# Patient Record
Sex: FEMALE | Race: WHITE
[De-identification: names, ages, dates, MRNs, and addresses within clinical notes are randomized per-mention and may not be internally consistent; named-entity substitution may affect disease eponyms.]

---

## 2019-04-28 ENCOUNTER — HOSPITAL ENCOUNTER (EMERGENCY)
Dept: HOSPITAL 97 - ER | Age: 44
LOS: 1 days | Discharge: HOME | End: 2019-04-29
Payer: COMMERCIAL

## 2019-04-28 DIAGNOSIS — F41.9: ICD-10-CM

## 2019-04-28 DIAGNOSIS — J44.9: Primary | ICD-10-CM

## 2019-04-28 PROCEDURE — 71046 X-RAY EXAM CHEST 2 VIEWS: CPT

## 2019-04-28 NOTE — XMS REPORT
Patient Summary Document

 Created on:2019



Patient:CHARLIE MAXWELL

Sex:Female

:1975

External Reference #:[id number]





Demographics







 Address  [address]



   [place] [address]

 

 Home Phone  [phone]

 

 Email Address  N

 

 Preferred Language  Unknown

 

 Marital Status  Unknown

 

 Voodoo Affiliation  Unknown

 

 Race  Unknown

 

 Additional Race(s)  Unavailable

 

 Ethnic Group  Unknown









Author







 Organization  [facility]

 

 Address  [address] [place] [id number]

 

 Phone  [phone]









Care Team Providers







 Name  Role  Phone

 

 Unavailable  Unavailable  Unavailable









Problems

This patient has no known problems.



Allergies, Adverse Reactions, Alerts

This patient has no known allergies or adverse reactions.



Medications

This patient has no known medications.

## 2019-04-29 NOTE — ER
Nurse's Notes                                                                                     

 [facility]                                                                 

Name: Molly Liao                                                                            

Age: 44 yrs                                                                                       

Sex: Female                                                                                       

: 1975                                                                                   

MRN: [id number]                                                                                   

Arrival Date: 2019                                                                          

Time: 21:35                                                                                       

Account#: [id number]                                                                            

Bed 8                                                                                             

Private MD: RACQUEL HOWARD                                                                      

Diagnosis: Chronic obstructive pulmonary disease, unspecified;Anxiety disorder, unspecified       

                                                                                                  

Presentation:                                                                                     

                                                                                             

21:42 Presenting complaint: Patient states: I have felt like I am short of breath since       la1 

      yesterday while I was at work. I have a history of COPD but also anxiety and I cant         

      tell what is going on. Transition of care: patient was not received from another            

      setting of care. Onset of symptoms was 2019. Risk Assessment: Do you want to      

      hurt yourself or someone else? Patient reports no desire to harm self or others.            

      Initial Sepsis Screen: Does the patient meet any 2 criteria? No. Patient's initial          

      sepsis screen is negative. Does the patient have a suspected source of infection? No.       

      Patient's initial sepsis screen is negative. Care prior to arrival: None.                   

21:42 Method Of Arrival: Ambulatory                                                           la1 

21:42 Acuity: ZANDRA 3                                                                           la1 

                                                                                                  

Triage Assessment:                                                                                

22:29 General: Appears in no apparent distress. comfortable. Respiratory: Onset: The          mg2 

      symptoms/episode began/occurred gradually, the patient has mild shortness of breath.        

                                                                                                  

OB/GYN:                                                                                           

23:00 lmp unknown                                                                             mg2 

                                                                                                  

Historical:                                                                                       

- Allergies:                                                                                      

21:41 No Known Allergies;                                                                     la1 

- PMHx:                                                                                           

21:41 COPD;                                                                                   la1 

                                                                                                  

- Immunization history:: Adult Immunizations up to date.                                          

- Social history:: Smoking status: Patient/guardian denies using tobacco.                         

- Ebola Screening: : No symptoms or risks identified at this time.                                

                                                                                                  

                                                                                                  

Screenin:29 Abuse screen: Denies threats or abuse. Denies injuries from another. Nutritional        mg2 

      screening: No deficits noted. Tuberculosis screening: No symptoms or risk factors           

      identified. Fall Risk None identified.                                                      

                                                                                                  

Assessment:                                                                                       

21:43 Reassessment: pt speaking full sentences in triage in no Respiratory distress.          la1 

      Respiratory: Airway is patent Trachea midline Respiratory effort is even, unlabored,        

      Respiratory pattern is regular, symmetrical, Breath sounds are clear.                       

22:28 General: Appears in no apparent distress. comfortable, Behavior is calm, cooperative.   mg2 

      Pain: Denies pain. Neuro: Level of Consciousness is awake, alert, obeys commands,           

      Oriented to person, place, time, situation. Cardiovascular: Capillary refill < 3            

      seconds Patient's skin is warm and dry. Respiratory: Reports shortness of breath. GI:       

      No signs and/or symptoms were reported involving the gastrointestinal system. : No        

      signs and/or symptoms were reported regarding the genitourinary system. EENT: No            

      deficits noted. Derm: Skin is intact, is healthy with good turgor, Skin is pink, warm \T\   

      dry. normal. Musculoskeletal: Circulation, motion, and sensation intact. Capillary          

      refill < 3 seconds.                                                                         

23:00 Cardiovascular: Rhythm is sinus rhythm.                                                 mg2 

23:45 Reassessment: Patient appears in no apparent distress at this time. Patient and/or      aa1 

      family updated on plan of care and expected duration. Pain level reassessed. Patient is     

      alert, oriented x 3, equal unlabored respirations, skin warm/dry/pink. Awaiting x-ray.      

                                                                                                  

Vital Signs:                                                                                      

21:42  / 76; Pulse 72; Resp 18; Temp 97.8; Pulse Ox 100% on R/A; Weight 50.8 kg; Height la1 

      5 ft. 5 in. (165.10 cm);                                                                    

23:45  / 68; Pulse 72; Resp 16; Pulse Ox 100% on R/A; Pain 0/10;                        aa1 

21:42 Body Mass Index 18.64 (50.80 kg, 165.10 cm)                                             la1 

                                                                                                  

ED Course:                                                                                        

21:35 Patient arrived in ED.                                                                  am2 

21:37 RACQUEL HOWARD is Private Physician.                                                  am2 

21:42 Arm band placed on left wrist.                                                          la1 

21:43 Triage completed.                                                                       la1 

22:04 Des Marks NP is PHCP.                                                           pm1 

22:04 Jaime Fisher MD is Attending Physician.                                             pm1 

22:09 Jose Cox, KARAN is Primary Nurse.                                                  mg2 

22:29 Patient has correct armband on for positive identification. Placed in gown. Bed in low  mg2 

      position. Call light in reach. Side rails up X 1. Pulse ox on. NIBP on. Door closed.        

      Warm blanket given.                                                                         

22:29 No provider procedures requiring assistance completed. Patient did not have IV access   mg2 

      during this emergency room visit.                                                           

23:58 Patient moved to radiology via wheelchair.                                              kw  

23:58 X-ray completed. Patient tolerated procedure well.                                      kw  

23:58 Patient moved back from radiology.                                                      kw  

23:59 Chest Pa And Lat (2 Views) XRAY In Process Unspecified.                                 EDMS

                                                                                                  

Administered Medications:                                                                         

22:27 Drug: Albuterol - atroVENT (3:1) (2.5 mg - 0.5 mg) 3 ml Route: Nebulizer;               mg2 

23:25 Follow up: Response: No adverse reaction; Marked relief of symptoms                     aa1 

22:27 Drug: predniSONE 60 mg Route: PO;                                                       mg2 

23:25 Follow up: Response: No adverse reaction; Marked relief of symptoms                     aa1 

                                                                                                  

                                                                                                  

Outcome:                                                                                          

                                                                                             

00:09 Discharge ordered by MD.                                                                pm1 

00:39 Discharged to home ambulatory.                                                          mg2 

00:39 Condition: stable                                                                           

00:39 Discharge instructions given to patient, Instructed on discharge instructions, follow       

      up and referral plans. medication usage, Demonstrated understanding of instructions,        

      follow-up care, medications, Prescriptions given X 3.                                       

00:41 Patient left the ED.                                                                    mg2 

                                                                                                  

Signatures:                                                                                       

Dispatcher [employer]                           EDMS                                                 

Katiuska England RN                  RN   aa1                                                  

Yoana Avila Lee, RN                         RN   la1                                                  

Des Marks, MARY JANE                    NP   pm1                                                  

Tuyet German                               am2                                                  

Jose Cox RN RN   mg2                                                  

                                                                                                  

**************************************************************************************************

## 2019-04-29 NOTE — EDPHYS
Physician Documentation                                                                           

 [facility]                                                                 

Name: Molly Liao                                                                            

Age: 44 yrs                                                                                       

Sex: Female                                                                                       

: 1975                                                                                   

MRN: [id number]                                                                                   

Arrival Date: 2019                                                                          

Time: 21:35                                                                                       

Account#: [id number]                                                                            

Bed 8                                                                                             

Private MD: RACQUEL HOWARD ED Physician Jaime Fisher                                                                      

HPI:                                                                                              

                                                                                             

22:30 This 44 yrs old  Female presents to ER via Ambulatory with complaints of       pm1 

      Breathing Difficulty.                                                                       

22:30 The patient has shortness of breath at rest. Onset: The symptoms/episode began/occurred pm1 

      yesterday. Duration: The symptoms are continuous. The patient's shortness of breath is      

      aggravated by anxiety, is alleviated by nothing. Associated signs and symptoms:             

      Pertinent positives: non-productive cough, Pertinent negatives: chest pain, fever,          

      nausea, vomiting. Severity of symptoms: in the emergency department the symptoms are        

      unchanged. The patient has experienced similar episodes in the past, several times. The     

      patient has not recently seen a physician. Patient isn't certain if it is her COPD or       

      her anxiety. Patient takes anoro and albuterol for her COPD. Patient used to take           

      hydroxyzine for her anxiety but she stopped taking them because they made her too           

      sleepy.                                                                                     

                                                                                                  

OB/GYN:                                                                                           

23:00 lmp unknown                                                                             mg2 

                                                                                                  

Historical:                                                                                       

- Allergies:                                                                                      

21:41 No Known Allergies;                                                                     la1 

- PMHx:                                                                                           

21:41 COPD;                                                                                   la1 

                                                                                                  

- Immunization history:: Adult Immunizations up to date.                                          

- Social history:: Smoking status: Patient/guardian denies using tobacco.                         

- Ebola Screening: : No symptoms or risks identified at this time.                                

                                                                                                  

                                                                                                  

ROS:                                                                                              

22:30 Constitutional: Negative for fever, chills, and weight loss, Eyes: Negative for injury, pm1 

      pain, redness, and discharge, ENT: Negative for injury, pain, and discharge, Neck:          

      Negative for injury, pain, and swelling, Cardiovascular: Negative for chest pain,           

      palpitations, and edema.                                                                    

22:30 Abdomen/GI: Negative for abdominal pain, nausea, vomiting, diarrhea, and constipation,      

      Back: Negative for injury and pain, : Negative for injury, bleeding, discharge, and       

      swelling, MS/Extremity: Negative for injury and deformity, Skin: Negative for injury,       

      rash, and discoloration, Neuro: Negative for headache, weakness, numbness, tingling,        

      and seizure.                                                                                

22:30 Respiratory: Positive for cough, with no reported sputum, shortness of breath, Negative     

      for wheezing.                                                                               

22:30 Psych: Positive for anxiety, Negative for depression.                                       

                                                                                                  

Exam:                                                                                             

22:30 Constitutional:  This is a well developed, well nourished patient who is awake, alert,  pm1 

      and in no acute distress. Head/Face:  Normocephalic, atraumatic. Eyes:  Pupils equal        

      round and reactive to light, extra-ocular motions intact.  Lids and lashes normal.          

      Conjunctiva and sclera are non-icteric and not injected.  Cornea within normal limits.      

      Periorbital areas with no swelling, redness, or edema. ENT:  Nares patent. No nasal         

      discharge, no septal abnormalities noted.  Tympanic membranes are normal and external       

      auditory canals are clear.  Oropharynx with no redness, swelling, or masses, exudates,      

      or evidence of obstruction, uvula midline.  Mucous membranes moist. Neck:  Trachea          

      midline, no thyromegaly or masses palpated, and no cervical lymphadenopathy.  Supple,       

      full range of motion without nuchal rigidity, or vertebral point tenderness.  No            

      Meningismus. Chest/axilla:  Normal chest wall appearance and motion.  Nontender with no     

      deformity.  No lesions are appreciated. Cardiovascular:  Regular rate and rhythm with a     

      normal S1 and S2.  No gallops, murmurs, or rubs.  Normal PMI, no JVD.  No pulse             

      deficits. Respiratory:  Lungs have equal breath sounds bilaterally, clear to                

      auscultation and percussion.  No rales, rhonchi or wheezes noted.  No increased work of     

      breathing, no retractions or nasal flaring. Abdomen/GI:  Soft, non-tender, with normal      

      bowel sounds.  No distension or tympany.  No guarding or rebound.  No evidence of           

      tenderness throughout. Back:  No spinal tenderness.  No costovertebral tenderness.          

      Full range of motion. Skin:  Warm, dry with normal turgor.  Normal color with no            

      rashes, no lesions, and no evidence of cellulitis. MS/ Extremity:  Pulses equal, no         

      cyanosis.  Neurovascular intact.  Full, normal range of motion.                             

22:30 Neuro: Orientation: is normal, Motor: is normal, moves all fours, Sensation: is normal,     

      no obvious gross deficits.                                                                  

                                                                                                  

Vital Signs:                                                                                      

21:42  / 76; Pulse 72; Resp 18; Temp 97.8; Pulse Ox 100% on R/A; Weight 50.8 kg; Height la1 

      5 ft. 5 in. (165.10 cm);                                                                    

23:45  / 68; Pulse 72; Resp 16; Pulse Ox 100% on R/A; Pain 0/10;                        aa1 

21:42 Body Mass Index 18.64 (50.80 kg, 165.10 cm)                                             la1 

                                                                                                  

MDM:                                                                                              

22:05 Patient medically screened.                                                             pm1 

                                                                                             

00:08 Data reviewed: vital signs. Data interpreted: Pulse oximetry: on room air is 100 %.     pm1 

      Interpretation: normal. Counseling: I had a detailed discussion with the patient and/or     

      guardian regarding: the historical points, exam findings, and any diagnostic results        

      supporting the discharge/admit diagnosis, radiology results, the need for outpatient        

      follow up, to return to the emergency department if symptoms worsen or persist or if        

      there are any questions or concerns that arise at home.                                     

                                                                                                  

                                                                                             

22:14 Order name: Chest Pa And Lat (2 Views) XRAY                                             pm1 

                                                                                                  

Administered Medications:                                                                         

                                                                                             

22:27 Drug: Albuterol - atroVENT (3:1) (2.5 mg - 0.5 mg) 3 ml Route: Nebulizer;               mg2 

23:25 Follow up: Response: No adverse reaction; Marked relief of symptoms                     aa1 

22:27 Drug: predniSONE 60 mg Route: PO;                                                       mg2 

23:25 Follow up: Response: No adverse reaction; Marked relief of symptoms                     aa1 

                                                                                                  

                                                                                                  

Disposition:                                                                                      

19 00:09 Discharged to Home. Impression: Chronic obstructive pulmonary disease,             

  unspecified, Anxiety disorder, unspecified.                                                     

- Condition is Stable.                                                                            

- Discharge Instructions: Chronic Obstructive Pulmonary Disease, How to Use an Inhaler,           

  Generalized Anxiety Disorder.                                                                   

- Prescriptions for Ativan 0.5 mg Oral Tablet - take 1 tablet by ORAL route every 8               

  hours As needed; 6 tablet. Albuterol Sulfate 2.5 mg /3 mL (0.083 %) Inhalation                  

  Solution for Nebulization - inhale 1 unit by NEBULIZATION route every 8 hours As                

  needed; 1 box. Medrol (Karan) 4 mg Oral Tablets, Dose Pack - take 1 tablet by ORAL                

  route as directed - follow package instructions; 1 packet.                                      

- Medication Reconciliation Form, Thank You Letter, Antibiotic Education, Prescription            

  Opioid Use, Work release form form.                                                             

- Follow up: Emergency Department; When: As needed; Reason: Worsening of condition.               

  Follow up: Private Physician; When: 2 - 3 days; Reason: Recheck today's complaints,             

  Continuance of care, Re-evaluation by your physician.                                           

- Problem is new.                                                                                 

- Symptoms have improved.                                                                         

                                                                                                  

                                                                                                  

                                                                                                  

Addendum:                                                                                         

2019                                                                                        

     11:18 Co-signature as Attending Physician, Jaime Fisher MD I agree with the assessment and  c
ha

           plan of care.                                                                          

                                                                                                  

Signatures:                                                                                       

Dispatcher [employer]                           EDJaime Shine MD MD cha Attema, Lee RN                         RN   la1                                                  

Des Marks NP                    NP   pm1                                                  

Jose Cox RN RN   mg2                                                  

Katiuska England RN   aa1                                                  

                                                                                                  

Corrections: (The following items were deleted from the chart)                                    

                                                                                             

00:41 00:09 2019 00:09 Discharged to Home. Impression: Chronic obstructive pulmonary    mg2 

      disease, unspecified; Anxiety disorder, unspecified. Condition is Stable. Forms are         

      Medication Reconciliation Form, Thank You Letter, Antibiotic Education, Prescription        

      Opioid Use. Follow up: Emergency Department; When: As needed; Reason: Worsening of          

      condition. Follow up: Private Physician; When: 2 - 3 days; Reason: Recheck today's          

      complaints, Continuance of care, Re-evaluation by your physician. Problem is new.           

      Symptoms have improved. pm1                                                                 

                                                                                                  

**************************************************************************************************

## 2019-04-29 NOTE — RAD REPORT
EXAM DESCRIPTION:  RAD - Chest Pa And Lat (2 Views) - 4/29/2019 12:01 am

 

CLINICAL HISTORY:  COPD;Cough

Chest pain.

 

COMPARISON:  No comparisons

 

TECHNIQUE:  PA and lateral views of the chest were obtained.

 

FINDINGS:  The lungs are hyperexpanded compatible with COPD. The heart is upper limit of normal in si
ze. No fracture or aggressive bony process.

 

IMPRESSION:  COPD without acute process identified.

## 2019-06-30 ENCOUNTER — HOSPITAL ENCOUNTER (EMERGENCY)
Dept: HOSPITAL 97 - ER | Age: 44
Discharge: HOME | End: 2019-06-30
Payer: COMMERCIAL

## 2019-06-30 DIAGNOSIS — F41.9: ICD-10-CM

## 2019-06-30 DIAGNOSIS — R20.2: Primary | ICD-10-CM

## 2019-06-30 LAB
BUN BLD-MCNC: 10 MG/DL (ref 7–18)
GLUCOSE SERPLBLD-MCNC: 73 MG/DL (ref 74–106)
HCT VFR BLD CALC: 40.7 % (ref 36–45)
LYMPHOCYTES # SPEC AUTO: 1.7 K/UL (ref 0.7–4.9)
METHAMPHET UR QL SCN: NEGATIVE
PMV BLD: 9 FL (ref 7.6–11.3)
POTASSIUM SERPL-SCNC: 3.4 MMOL/L (ref 3.5–5.1)
RBC # BLD: 4.31 M/UL (ref 3.86–4.86)
THC SERPL-MCNC: NEGATIVE NG/ML

## 2019-06-30 PROCEDURE — 81003 URINALYSIS AUTO W/O SCOPE: CPT

## 2019-06-30 PROCEDURE — 85025 COMPLETE CBC W/AUTO DIFF WBC: CPT

## 2019-06-30 PROCEDURE — 99284 EMERGENCY DEPT VISIT MOD MDM: CPT

## 2019-06-30 PROCEDURE — 80048 BASIC METABOLIC PNL TOTAL CA: CPT

## 2019-06-30 PROCEDURE — 36415 COLL VENOUS BLD VENIPUNCTURE: CPT

## 2019-06-30 PROCEDURE — 70450 CT HEAD/BRAIN W/O DYE: CPT

## 2019-06-30 PROCEDURE — 80307 DRUG TEST PRSMV CHEM ANLYZR: CPT

## 2019-06-30 PROCEDURE — 81025 URINE PREGNANCY TEST: CPT

## 2019-06-30 NOTE — RAD REPORT
EXAM DESCRIPTION:  CT - Head Brain Wo Cont - 6/30/2019 7:47 pm

 

CLINICAL HISTORY:  NUMBNESS

Headache, drowsiness

 

COMPARISON:  Head Brain Wo Cont dated 7/16/2017

 

TECHNIQUE:  All CT scans are performed using dose optimization technique as appropriate and may inclu
de automated exposure control or mA/KV adjustment according to patient size.

 

FINDINGS:  No intracranial hemorrhage, hydrocephalus or extra-axial fluid collection.No areas of brai
n edema or evidence of midline shift.

 

Mild right mastoid effusion. The paranasal sinuses and mastoids are otherwise clear. The calvarium is
 intact.

 

IMPRESSION:  No acute intracranial abnormality.

## 2019-06-30 NOTE — ER
Nurse's Notes                                                                                     

 [facility]                                                                 

Name: Molly Liao                                                                            

Age: 44 yrs                                                                                       

Sex: Female                                                                                       

: 1975                                                                                   

MRN: [id number]                                                                                   

Arrival Date: 2019                                                                          

Time: 18:37                                                                                       

Account#: [id number]                                                                            

Bed 27                                                                                            

Private MD:                                                                                       

Diagnosis: Paresthesia of skin                                                                    

                                                                                                  

Presentation:                                                                                     

                                                                                             

18:39 Presenting complaint: Patient states: "I woke up about 3 in the morning I noticed       aj1 

      numbness in my whole lip and kind of into my chin and I just feel a little off" Patient     

      reports fatigue, Equal hand , equal smile. Patient ambulated to triage with a          

      steady gait. Transition of care: patient was not received from another setting of care.     

      Onset of symptoms was 2019 at 03:00. Risk Assessment: Do you want to hurt          

      yourself or someone else? Patient reports no desire to harm self or others. Initial         

      Sepsis Screen: Does the patient meet any 2 criteria? No. Patient's initial sepsis           

      screen is negative. Does the patient have a suspected source of infection? No.              

      Patient's initial sepsis screen is negative. Care prior to arrival: None.                   

18:39 Method Of Arrival: Ambulatory                                                           aj1 

18:39 Acuity: ZANDRA 3                                                                           aj1 

                                                                                                  

Triage Assessment:                                                                                

18:42 General: Appears in no apparent distress. comfortable, Behavior is calm, cooperative,   aj1 

      appropriate for age. Pain: Denies pain. Neuro: Level of Consciousness is awake, alert,      

      obeys commands, Oriented to person, place, time, situation. Neuro:  are equal          

      bilaterally Moves all extremities. Full function Gait is steady, Speech is normal,          

      Facial symmetry appears normal, Reports numbness in lower lip and chin. Cardiovascular:     

      Patient's skin is warm and dry. Respiratory: Airway is patent Respiratory effort is         

      even, unlabored, Respiratory pattern is regular, symmetrical.                               

                                                                                                  

OB/GYN:                                                                                           

18:42 LMP 2019                                                                           aj1 

                                                                                                  

Historical:                                                                                       

- Allergies:                                                                                      

18:42 No Known Allergies;                                                                     aj1 

- Home Meds:                                                                                      

18:42 Suboxone sublingual sublingual [Active]; Lexapro Oral [Active];                         aj1 

- PMHx:                                                                                           

18:42 Anxiety; slipped disk in back;                                                          aj1 

- PSHx:                                                                                           

18:42 Tubal ligation;                                                                         aj1 

                                                                                                  

- Immunization history:: Flu vaccine is not up to date.                                           

- Social history:: Smoking status: Patient/guardian denies using tobacco.                         

- Ebola Screening: : Patient denies travel to an Ebola-affected area in the 21 days               

  before illness onset.                                                                           

                                                                                                  

                                                                                                  

Screenin:24 Abuse screen: Denies threats or abuse. Nutritional screening: No deficits noted.        la1 

      Tuberculosis screening: No symptoms or risk factors identified. Fall Risk None              

      identified.                                                                                 

                                                                                                  

Assessment:                                                                                       

19:24 General: Appears in no apparent distress. Behavior is calm, cooperative. Pain: Denies   la1 

      pain. Neuro: Level of Consciousness is awake, alert, obeys commands, Oriented to            

      person, place, time, situation,  are equal bilaterally Moves all extremities. Full     

      function Gait is steady, Speech is normal, Facial symmetry appears normal, Pupils are       

      PERRLA, Numbness in lower lip and chin. Cardiovascular: Capillary refill < 3 seconds        

      Patient's skin is warm and dry. Respiratory: Airway is patent Respiratory effort is         

      even, unlabored, Respiratory pattern is regular, symmetrical. GI: No signs and/or           

      symptoms were reported involving the gastrointestinal system. : No signs and/or           

      symptoms were reported regarding the genitourinary system.                                  

                                                                                                  

Vital Signs:                                                                                      

18:42  / 71; Pulse 90; Resp 18; Temp 98.6; Pulse Ox 100% on R/A;                        aj1 

18:42 Weight 54.43 kg (R); Height 5 ft. 5 in. (165.10 cm) (R);                                aj1 

18:42 Body Mass Index 19.97 (54.43 kg, 165.10 cm)                                             aj1 

                                                                                                  

NIH Stroke Scale Scores:                                                                          

20:44 NIHSS Score: 0                                                                            

                                                                                                  

ED Course:                                                                                        

18:37 Patient arrived in ED.                                                                  as  

18:41 Triage completed.                                                                       aj1 

19:10 Joshua Starks MD is Attending Physician.                                              gs  

19:23 Waqas Saavedra, RN is Primary Nurse.                                                       la1 

19:24 Arm band placed on right wrist.                                                         la1 

19:25 Side rails up X 1.                                                                      la1 

19:45 Initial lab(s) drawn, by me, sent to lab. Inserted saline lock: 20 gauge in right       lt1 

      antecubital area, using aseptic technique.                                                  

19:46 Basic Metabolic Panel Sent.                                                             lt1 

19:46 CBC with Diff Sent.                                                                     lt1 

19:48 CT completed. Patient tolerated procedure well. Patient moved to CT. Patient moved back ka  

      from CT.                                                                                    

19:50 CT Head Brain wo Cont In Process Unspecified.                                           EDMS

20:35 Pike, [place], MD is Referral Physician.                                               

20:53 No provider procedures requiring assistance completed. intact, bleeding controlled, No  la1 

      redness/swelling at site. Pressure dressing applied.                                        

                                                                                                  

Administered Medications:                                                                         

No medications were administered                                                                  

                                                                                                  

                                                                                                  

Outcome:                                                                                          

20:36 Discharge ordered by MD.                                                                gs  

20:53 Discharged to home ambulatory.                                                          la1 

20:53 Condition: stable                                                                           

20:53 Discharge instructions given to patient, Instructed on discharge instructions, follow       

      up and referral plans. medication usage, Demonstrated understanding of instructions,        

      follow-up care.                                                                             

20:53 Patient left the ED.                                                                    la1 

                                                                                                  

                                                                                                  

NIH Stroke Scale - NIH Stroke Score                                                               

Date: 2019                                                                                  

Time: 20:44                                                                                       

Total Score = 0                                                                                   

  1a. Level of Consciousness (LOC) - 0(Alert)                                                     

  1b. Level of Consciousness (LOC) (Year \T\ Age) - 0(Both)                                       

  1c. LOC Commands (Open \T\ Closes Eyes/) - 0(Both)                                          

   2. Best Gaze (Lateral Gaze Paresis) - 0(Normal)                                                

   3. Visual Field Loss - 0(No visual loss)                                                       

   4. Facial Palsy - 0(Normal)                                                                    

  5a. Left Arm: Motor (10-second hold) - 0(No drift)                                              

  5b. Right Arm: Motor (10-second hold) - 0(No drift)                                             

  6a. Left Leg: Motor (5-second hold - always test supine) - 0(No drift)                          

  6b. Right Leg: Motor (5-second hold - always test supine) - 0(No drift)                         

   7. Limb Ataxia (finger/nose \T\ heel/shin - test with eyes open) - 0(Absent)                   

   8. Sensory Loss (pinprick arms/legs/face) - 0(Normal)                                          

   9. Best Language: Aphasia (description/naming/reading) - 0(No aphasia)                         

  10. Dysarthria (speech clarity - read or repeat words) - 0(Normal)                              

  11. Extinction and Inattention (visual/tactile/auditory/spatial/personal) - 0(No                

      abnormality)                                                                                

Initials:                                                                                       

                                                                                                  

Signatures:                                                                                       

Dispatcher [employer]                           EDMS                                                 

Pauline Boone, RN                     RN   aj1                                                  

Taylor Fountain Lee, RN                         RN   la1                                                  

Maisha Pedersen Gregory, MD MD                                                      

Michaels, Jessica                                   1                                                  

                                                                                                  

**************************************************************************************************

## 2019-06-30 NOTE — EDPHYS
Physician Documentation                                                                           

 [facility]                                                                 

Name: Molly Liao                                                                            

Age: 44 yrs                                                                                       

Sex: Female                                                                                       

: 1975                                                                                   

MRN: [id number]                                                                                   

Arrival Date: 2019                                                                          

Time: 18:37                                                                                       

Account#: [id number]                                                                            

Bed 27                                                                                            

Private MD:                                                                                       

ED Physician Joshua Starks                                                                       

HPI:                                                                                              

                                                                                             

20:29 This 44 yrs old  Female presents to ER via Ambulatory with complaints of       gs  

      Numbness Of Lips.                                                                           

20:29 The patient presents to the emergency department with paresthesias of the lower lip.    gs  

      Onset: The symptoms/episode began/occurred today, at 03:00. Context: occurred at home,      

      occurred while the patient was receiving bad or unexpected news. Associated signs and       

      symptoms: Pertinent negatives: altered mental status, dizziness, fever, seizure,            

      syncope, loss of vision, weakness. Severity of symptoms: At their worst the symptoms        

      were moderate in the emergency department the symptoms are unchanged. The patient has       

      not experienced similar symptoms in the past.                                               

                                                                                                  

OB/GYN:                                                                                           

18:42 LMP 2019                                                                           aj1 

                                                                                                  

Historical:                                                                                       

- Allergies:                                                                                      

18:42 No Known Allergies;                                                                     aj1 

- Home Meds:                                                                                      

18:42 Suboxone sublingual sublingual [Active]; Lexapro Oral [Active];                         aj1 

- PMHx:                                                                                           

18:42 Anxiety; slipped disk in back;                                                          aj1 

- PSHx:                                                                                           

18:42 Tubal ligation;                                                                         aj1 

                                                                                                  

- Immunization history:: Flu vaccine is not up to date.                                           

- Social history:: Smoking status: Patient/guardian denies using tobacco.                         

- Ebola Screening: : Patient denies travel to an Ebola-affected area in the 21 days               

  before illness onset.                                                                           

                                                                                                  

                                                                                                  

ROS:                                                                                              

20:29 All other systems are negative.                                                         gs  

                                                                                                  

Exam:                                                                                             

20:29 Head/Face:  Normocephalic, atraumatic. Eyes:  Pupils equal round and reactive to light, gs  

      extra-ocular motions intact.  Lids and lashes normal.  Conjunctiva and sclera are           

      non-icteric and not injected.  Cornea within normal limits.  Periorbital areas with no      

      swelling, redness, or edema. ENT:  Nares patent. No nasal discharge, no septal              

      abnormalities noted.  Tympanic membranes are normal and external auditory canals are        

      clear.  Oropharynx with no redness, swelling, or masses, exudates, or evidence of           

      obstruction, uvula midline.  Mucous membranes moist. Neck:  Trachea midline, no             

      thyromegaly or masses palpated, and no cervical lymphadenopathy.  Supple, full range of     

      motion without nuchal rigidity, or vertebral point tenderness.  No Meningismus.             

      Chest/axilla:  Normal chest wall appearance and motion.  Nontender with no deformity.       

      No lesions are appreciated. Cardiovascular:  Regular rate and rhythm with a normal S1       

      and S2.  No gallops, murmurs, or rubs.  Normal PMI, no JVD.  No pulse deficits.             

      Respiratory:  Lungs have equal breath sounds bilaterally, clear to auscultation and         

      percussion.  No rales, rhonchi or wheezes noted.  No increased work of breathing, no        

      retractions or nasal flaring. Abdomen/GI:  Soft, non-tender, with normal bowel sounds.      

      No distension or tympany.  No guarding or rebound.  No evidence of tenderness               

      throughout. Back:  No spinal tenderness.  No costovertebral tenderness.  Full range of      

      motion. Skin:  Warm, dry with normal turgor.  Normal color with no rashes, no lesions,      

      and no evidence of cellulitis. MS/ Extremity:  Pulses equal, no cyanosis.                   

      Neurovascular intact.  Full, normal range of motion.                                        

20:29 Constitutional: The patient appears alert, awake.                                           

20:29 Neuro: Orientation: is normal, Mentation: is normal, Cranial nerves: CN II- XII are         

      normal as tested, decreased sensation left side lower lip only. Cerebellar function:        

      Motor: no acute changes, Sensation: pin prick is decreased in the  lower lip.               

                                                                                                  

Vital Signs:                                                                                      

18:42  / 71; Pulse 90; Resp 18; Temp 98.6; Pulse Ox 100% on R/A;                        aj1 

18:42 Weight 54.43 kg (R); Height 5 ft. 5 in. (165.10 cm) (R);                                aj1 

18:42 Body Mass Index 19.97 (54.43 kg, 165.10 cm)                                             aj 

                                                                                                  

NIH Stroke Scale Scores:                                                                          

20:44 NIHSS Score: 0                                                                          gs  

                                                                                                  

MDM:                                                                                              

19:28 Patient medically screened.                                                               

20:29 Data reviewed: vital signs, nurses notes, lab test result(s), radiologic studies.       gs  

      Counseling: I had a detailed discussion with the patient and/or guardian regarding: the     

      historical points, exam findings, and any diagnostic results supporting the                 

      discharge/admit diagnosis, lab results, radiology results, the need for outpatient          

      follow up, a neurologist. Response to treatment: the patient's symptoms have mildly         

      improved after treatment, and as a result, I will discharge patient. ED course: no          

      stroke, will refer to neurology.                                                            

                                                                                                  

                                                                                             

19:33 Order name: CBC with Diff                                                                 

                                                                                             

19:33 Order name: Basic Metabolic Panel                                                         

                                                                                             

19:33 Order name: Urine Drug Screen; Complete Time: 20:24                                     gs  

                                                                                             

19:34 Order name: CBC with Automated Diff; Complete Time: 20:24                               EDMS

                                                                                             

19:34 Order name: Basic Metabolic Panel; Complete Time: 20:24                                 EDMS

                                                                                             

19:33 Order name: Urine Pregnancy Test (obtain specimen); Complete Time: 19:45                  

                                                                                             

19:33 Order name: Urine Dipstick-Ancillary (obtain specimen); Complete Time: 19:45              

                                                                                             

19:33 Order name: CT Head Brain wo Cont; Complete Time: 20:24                                 gs  

                                                                                             

19:47 Order name: Urine Dipstick--Ancillary (enter results); Complete Time: 20:24             ar5 

                                                                                             

19:47 Order name: Urine Pregnancy--Ancillary (enter results); Complete Time: 20:24            ar5 

                                                                                                  

Administered Medications:                                                                         

No medications were administered                                                                  

                                                                                                  

                                                                                                  

Disposition:                                                                                      

19 20:36 Discharged to Home. Impression: Paresthesia of skin.                               

- Condition is Stable.                                                                            

- Discharge Instructions: Paresthesia, Easy-to-Read.                                              

                                                                                                  

- Medication Reconciliation Form, Thank You Letter, Antibiotic Education, Prescription            

  Opioid Use form.                                                                                

- Follow up: Bola Martinez MD; When: 2 - 3 days; Reason: Re-evaluation by your                

  physician.                                                                                      

                                                                                                  

                                                                                                  

                                                                                                  

                                                                                                  

NIH Stroke Scale - NIH Stroke Score                                                               

Date: 2019                                                                                  

Time: 20:44                                                                                       

Total Score = 0                                                                                   

  1a. Level of Consciousness (LOC) - 0(Alert)                                                     

  1b. Level of Consciousness (LOC) (Year \T\ Age) - 0(Both)                                       

  1c. LOC Commands (Open \T\ Closes Eyes/) - 0(Both)                                          

   2. Best Gaze (Lateral Gaze Paresis) - 0(Normal)                                                

   3. Visual Field Loss - 0(No visual loss)                                                       

   4. Facial Palsy - 0(Normal)                                                                    

  5a. Left Arm: Motor (10-second hold) - 0(No drift)                                              

  5b. Right Arm: Motor (10-second hold) - 0(No drift)                                             

  6a. Left Leg: Motor (5-second hold - always test supine) - 0(No drift)                          

  6b. Right Leg: Motor (5-second hold - always test supine) - 0(No drift)                         

   7. Limb Ataxia (finger/nose \T\ heel/shin - test with eyes open) - 0(Absent)                   

   8. Sensory Loss (pinprick arms/legs/face) - 0(Normal)                                          

   9. Best Language: Aphasia (description/naming/reading) - 0(No aphasia)                         

  10. Dysarthria (speech clarity - read or repeat words) - 0(Normal)                              

  11. Extinction and Inattention (visual/tactile/auditory/spatial/personal) - 0(No                

      abnormality)                                                                                

Initials: gs                                                                                      

                                                                                                  

Signatures:                                                                                       

Dispatcher [employer]                           EDMS                                                 

Pauline Boone RN                     RN   aj1                                                  

Waqas Saavedra RN                         RN   la1                                                  

Joshua Starks MD MD gs                                                   

                                                                                                  

Corrections: (The following items were deleted from the chart)                                    

19:50 19:34 UA MICROSCOPIC+U.LAB.BRZ ordered. EDMS                                    EDMS        

20:53 20:36 2019 20:36 Discharged to Home. Impression: Paresthesia of skin.     la1         

      Condition is Stable. Forms are Medication Reconciliation Form, Thank You                    

      Letter, Antibiotic Education, Prescription Opioid Use. Follow up: Bola Martinez; When: 2 - 3 days; Reason: Re-evaluation by your physician. gs                     

                                                                                                  

**************************************************************************************************

## 2019-06-30 NOTE — XMS REPORT
Patient Summary Document

 Created on:2019



Patient:CHARLIE MAXWELL

Sex:Female

:1975

External Reference #:[id number]





Demographics







 Address  [address] [place]



   [place] [address]

 

 Home Phone  [phone]

 

 Email Address  NONE

 

 Preferred Language  Unknown

 

 Marital Status  Unknown

 

 [employer] Affiliation  Unknown

 

 Race  Unknown

 

 Additional Race(s)  Unavailable

 

 Ethnic Group  Unknown









Author







 Organization  [facility]

 

 Address  [address]. [address] [place] [id number]

 

 Phone  [phone]









Care Team Providers







 Name  Role  Phone

 

 Unavailable  Unavailable  Unavailable









Problems

This patient has no known problems.



Allergies, Adverse Reactions, Alerts

This patient has no known allergies or adverse reactions.



Medications

This patient has no known medications.